# Patient Record
(demographics unavailable — no encounter records)

---

## 2025-03-18 NOTE — HISTORY OF PRESENT ILLNESS
[Postpartum Follow Up] : postpartum follow up [Delivery Date: ___] : on [unfilled] [Repeat C/S] : delivered by  section (repeat) [Clean/Dry/Intact] : clean, dry and intact [Back to Normal] : is back to normal in size [Mild] : mild vaginal bleeding [Normal] : the vagina was normal [Not Done] : Examination of breasts not done [Doing Well] : is doing well [No Sign of Infection] : is showing no signs of infection [Excellent Pain Control] : has excellent pain control [None] : None [Complications:___] : no complications [S/Sx PP Depression] : no signs/symptoms of postpartum depression [Erythema] : not erythematous [Cervix Sample Taken] : cervical sample not taken for a Pap smear [FreeTextEntry1] : pt 2.5 weeks post partum sutures removed today- steri strips applied  c/o bilateral hand swelling. she states it has been getting better but was worse a few days ago. advised will continue to resolve.  discussed supportive care measures pt is using formula at this time we discussed birth control options at this visit. pt to have a plan by her 6 week post partum appointment. she desires no future pregnancies. she is interested in withdrawal method. advised against this and having a more permanent solution.  pt is healing well; no signs of infection on exam pt advised she will likely shed the remaining of the placental scar in the next 4 weeks; which may result in heavy bleeding consistent with a period. patient advised this is not a period. pt to call with heavy bleeding or pain uncontrolled with NSAIDs no signs of post partum depression or anxiety at this time. all questions answered; pt agreeable with plan   I Rocio CARTER am scribing for the presence of Dr. Cody the following sections HISTORY OF PRESENT ILLNESS, PAST MEDICAL/FAMILY/SOCIAL HISTORY; REVIEW OF SYSTEMS; VITAL SIGNS; PHYSICAL EXAM; DISPOSITION.    I personally performed the services described in the documentation, reviewed the documentation recorded by the scribe in my presence and it accurately and completely records my words and actions.

## 2025-04-11 NOTE — PHYSICAL EXAM
[Chaperone Declined] : Chaperone offered however refused by patient, [Normal] : cervix [Discharge] : a  ~M vaginal discharge was present [Scant] : scant [Noah] : yellow [Watery] : watery [No Bleeding] : there was no active vaginal bleeding

## 2025-04-17 NOTE — HISTORY OF PRESENT ILLNESS
[Postpartum Follow Up] : postpartum follow up [Delivery Date: ___] : on [unfilled] [Repeat C/S] : delivered by  section (repeat) [Healed] : healed [Back to Normal] : is back to normal in size [Normal] : the vagina was normal [Doing Well] : is doing well [No Sign of Infection] : is showing no signs of infection [Excellent Pain Control] : has excellent pain control [None] : None [Complications:___] : no complications [Breastfeeding] : not currently nursing [S/Sx PP Depression] : no signs/symptoms of postpartum depression [Erythema] : not erythematous [FreeTextEntry1] : pt 6 weeks post partum discussed supportive care measures pt is currently using formula at this time pt is healing well; no signs of infection on exam BC plan: NFP, discussed waiting 6 months post partum before attempting to conceive   pt to call with heavy bleeding or pain uncontrolled with NSAIDs no signs of post partum depression or anxiety at this time. return for annual visit  all questions answered; pt agreeable with plan    I Herlinda Brito [facility] am scribing for the presence of Dr. Cody the following sections HISTORY OF PRESENT ILLNESS, PAST MEDICAL/FAMILY/SOCIAL HISTORY; REVIEW OF SYSTEMS; VITAL SIGNS; PHYSICAL EXAM; DISPOSITION. I personally performed the services described in the documentation, reviewed the documentation recorded by the scribe in my presence and it accurately and completely records my words and actions.

## 2025-04-17 NOTE — HISTORY OF PRESENT ILLNESS
[Postpartum Follow Up] : postpartum follow up [Delivery Date: ___] : on [unfilled] [Repeat C/S] : delivered by  section (repeat) [Healed] : healed [Back to Normal] : is back to normal in size [Normal] : the vagina was normal [Doing Well] : is doing well [No Sign of Infection] : is showing no signs of infection [Excellent Pain Control] : has excellent pain control [None] : None [Complications:___] : no complications [Breastfeeding] : not currently nursing [S/Sx PP Depression] : no signs/symptoms of postpartum depression [Erythema] : not erythematous [FreeTextEntry1] : pt 6 weeks post partum discussed supportive care measures pt is currently using formula at this time pt is healing well; no signs of infection on exam BC plan: NFP, discussed waiting 6 months post partum before attempting to conceive   pt to call with heavy bleeding or pain uncontrolled with NSAIDs no signs of post partum depression or anxiety at this time. return for annual visit  all questions answered; pt agreeable with plan    I Herlinda Brito [facility] am scribing for the presence of Dr. Cody the following sections HISTORY OF PRESENT ILLNESS, PAST MEDICAL/FAMILY/SOCIAL HISTORY; REVIEW OF SYSTEMS; VITAL SIGNS; PHYSICAL EXAM; DISPOSITION. I personally performed the services described in the documentation, reviewed the documentation recorded by the scribe in my presence and it accurately and completely records my words and actions.